# Patient Record
Sex: MALE | NOT HISPANIC OR LATINO | URBAN - METROPOLITAN AREA
[De-identification: names, ages, dates, MRNs, and addresses within clinical notes are randomized per-mention and may not be internally consistent; named-entity substitution may affect disease eponyms.]

---

## 2017-04-21 ENCOUNTER — IMPORTED ENCOUNTER (OUTPATIENT)
Dept: URBAN - METROPOLITAN AREA CLINIC 1 | Facility: CLINIC | Age: 27
End: 2017-04-21

## 2017-04-21 PROBLEM — H52.13: Noted: 2017-04-21

## 2017-04-21 PROCEDURE — S0621 ROUTINE OPHTHALMOLOGICAL EXA: HCPCS

## 2017-04-21 NOTE — PATIENT DISCUSSION
1. Myopia: Rx was given for correction if indicated and requested. 2. Dry Eyes w/ PEK OU Return for an appointment in 1 year for 40 and Contact lens check. with Dr. Samaria Jones.

## 2018-09-21 ENCOUNTER — IMPORTED ENCOUNTER (OUTPATIENT)
Dept: URBAN - METROPOLITAN AREA CLINIC 1 | Facility: CLINIC | Age: 28
End: 2018-09-21

## 2018-09-21 PROBLEM — H52.13: Noted: 2018-09-21

## 2018-09-21 PROCEDURE — S0621 ROUTINE OPHTHALMOLOGICAL EXA: HCPCS

## 2018-09-21 NOTE — PATIENT DISCUSSION
1. Myopia OU -- Finalized Glasses MRx was given to patient today for correction if indicated and requested2. Dry Eyes w/ PEK OU -- Recommend the frequent use of OTC AT's BID-QID OU3. Strabismus Surgery OD (multiple times most recent 2007) 4. Amblyopia ODFinalized CTL Rx & given to patient today. Return for an appointment in 1 YR for a 36 / CC OU with Dr. Joni Schaumann.

## 2019-10-01 ENCOUNTER — IMPORTED ENCOUNTER (OUTPATIENT)
Dept: URBAN - METROPOLITAN AREA CLINIC 1 | Facility: CLINIC | Age: 29
End: 2019-10-01

## 2019-10-01 PROBLEM — H52.223: Noted: 2019-10-01

## 2019-10-01 PROBLEM — H52.13: Noted: 2019-10-01

## 2019-10-01 PROCEDURE — S0621 ROUTINE OPHTHALMOLOGICAL EXA: HCPCS

## 2019-10-01 NOTE — PATIENT DISCUSSION
1. Myopia / Astigmatism OU -- Finalized Glasses MRx was given to patient today for correction if indicated and requested. 2. Dry Eyes w/ PEK OU -- Recommend the frequent use of OTC AT's BID-QID OU Routinely. 3.  Strabismus Surgery OD (multiple times most recent 2007) 4. Amblyopia ODTrial CTL were ordered for patient today. Return for an appointment in 1 WK for a CC OU with Dr. Ruben Rios (after trials have come in & been p/u by patient).

## 2019-10-18 ENCOUNTER — IMPORTED ENCOUNTER (OUTPATIENT)
Dept: URBAN - METROPOLITAN AREA CLINIC 1 | Facility: CLINIC | Age: 29
End: 2019-10-18

## 2019-10-18 NOTE — PATIENT DISCUSSION
CC today. Good comfort fit and vision. Change made OD to improve fit. Finalized new CTL Rx today. Return for an appointment in 1 year for a 40/cc with Dr. Frandy Oliver.

## 2020-12-23 ENCOUNTER — IMPORTED ENCOUNTER (OUTPATIENT)
Dept: URBAN - METROPOLITAN AREA CLINIC 1 | Facility: CLINIC | Age: 30
End: 2020-12-23

## 2020-12-23 PROBLEM — H16.001: Noted: 2020-12-23

## 2020-12-23 PROCEDURE — 92012 INTRM OPH EXAM EST PATIENT: CPT

## 2020-12-23 NOTE — PATIENT DISCUSSION
1. Peripheral Corneal Pinpoint Ulcer OD -- Begin Besivance QID OD (2 samples given). Begin ATs Q2Hrs OD (Sample of Refresh Cory 3's given). No CTL use until seen again. Return immediately if ulcer larger or symptoms worsen. 2.  ROSCOE w/ PEK OU -- Cont the use of ATs BID OU routinely. 3.  Conjunctival Fold OD -- Stable. Return for an appointment next week 40/cc with Dr. Charissa Amos.

## 2020-12-30 ENCOUNTER — IMPORTED ENCOUNTER (OUTPATIENT)
Dept: URBAN - METROPOLITAN AREA CLINIC 1 | Facility: CLINIC | Age: 30
End: 2020-12-30

## 2020-12-30 PROBLEM — H44.21: Noted: 2020-12-30

## 2020-12-30 PROBLEM — H52.12: Noted: 2020-12-30

## 2020-12-30 PROBLEM — H52.223: Noted: 2020-12-30

## 2020-12-30 PROCEDURE — S0621 ROUTINE OPHTHALMOLOGICAL EXA: HCPCS

## 2020-12-30 NOTE — PATIENT DISCUSSION
1. Myopia- Rx was given for correction if indicated and requested. 2. Astigmatism OU- Rx was given for correction if indicated and requested. 3. ROSCOE w/ PEK OU- Recommend the use of AT's BID OU  4. Conjunctival Fold OD- Stable5. H/o Peripheral Corneal Pinpoint Ulcer ODFinalized CTL MRxReturn for an appointment in 1 yr 40/CC with Dr. Mickie Palomino.

## 2021-12-30 ENCOUNTER — IMPORTED ENCOUNTER (OUTPATIENT)
Dept: URBAN - METROPOLITAN AREA CLINIC 1 | Facility: CLINIC | Age: 31
End: 2021-12-30

## 2021-12-30 PROBLEM — H52.223: Noted: 2021-12-30

## 2021-12-30 PROBLEM — H52.12: Noted: 2021-12-30

## 2021-12-30 PROBLEM — H44.21: Noted: 2021-12-30

## 2021-12-30 PROCEDURE — S0621 ROUTINE OPHTHALMOLOGICAL EXA: HCPCS

## 2021-12-30 NOTE — PATIENT DISCUSSION
1. Myopia w/ Astigmatism OU - Rx was given for correction if indicated and requested. 2. CTL rx finalized3. ROSCOE w/ PEK OU - Recommend the use of AT's BID OU. 4.  Conjunctival Fold OD>OS - Stable. 5. H/o Peripheral Corneal Pinpoint Ulcer ODReturn for an appointment in 1 year for 40/cc with Dr. Laura Coleman.

## 2022-04-02 ASSESSMENT — KERATOMETRY
OS_K1POWER_DIOPTERS: 40.50
OS_AXISANGLE_DEGREES: 176
OS_K2POWER_DIOPTERS: 42.25
OD_K1POWER_DIOPTERS: 40.25
OD_AXISANGLE2_DEGREES: 080
OD_AXISANGLE_DEGREES: 170
OS_AXISANGLE2_DEGREES: 086
OD_K2POWER_DIOPTERS: 42.75

## 2022-04-02 ASSESSMENT — TONOMETRY
OD_IOP_MMHG: 14
OS_IOP_MMHG: 15
OS_IOP_MMHG: 14
OD_IOP_MMHG: 14
OD_IOP_MMHG: 15
OD_IOP_MMHG: 15
OD_IOP_MMHG: 14
OS_IOP_MMHG: 15
OS_IOP_MMHG: 14
OS_IOP_MMHG: 14

## 2022-04-02 ASSESSMENT — VISUAL ACUITY
OD_SC: 20/20
OD_SC: 20/20
OS_SC: 20/20
OS_CC: J1+
OS_SC: 20/20
OS_SC: 20/20
OD_SC: 20/25
OS_SC: 20/20
OD_SC: 20/30
OU_SC: 20/20
OS_SC: 20/20
OD_CC: J1+
OD_SC: 20/25-2
OD_SC: 20/20
OD_SC: 20/25-2